# Patient Record
Sex: MALE | Race: BLACK OR AFRICAN AMERICAN | Employment: OTHER | URBAN - METROPOLITAN AREA
[De-identification: names, ages, dates, MRNs, and addresses within clinical notes are randomized per-mention and may not be internally consistent; named-entity substitution may affect disease eponyms.]

---

## 2017-10-24 ENCOUNTER — HOSPITAL ENCOUNTER (OUTPATIENT)
Dept: PHYSICAL THERAPY | Age: 52
Discharge: HOME | End: 2017-10-24
Payer: COMMERCIAL

## 2017-10-24 PROCEDURE — 97162 PT EVAL MOD COMPLEX 30 MIN: CPT

## 2017-10-24 PROCEDURE — 97110 THERAPEUTIC EXERCISES: CPT

## 2017-10-24 PROCEDURE — 97140 MANUAL THERAPY 1/> REGIONS: CPT
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
## 2017-10-27 ENCOUNTER — HOSPITAL ENCOUNTER (OUTPATIENT)
Dept: PHYSICAL THERAPY | Age: 52
Discharge: HOME | End: 2017-10-27
Payer: COMMERCIAL

## 2017-10-27 PROCEDURE — 97110 THERAPEUTIC EXERCISES: CPT

## 2017-10-27 PROCEDURE — 97140 MANUAL THERAPY 1/> REGIONS: CPT

## 2017-10-27 NOTE — PROGRESS NOTES
PHYSICAL THERAPY - DAILY TREATMENT NOTE    Patient Name: Genevieve Morse        Date: 10/27/2017  : 1965   YES Patient  Verified  Visit #:   2   of   12  Insurance: Payor: Rosa Quintero / Plan: [facility] PPO / Product Type: PPO /      In time: 9:35 Out time: 10:30   Total Treatment Time: 55     Medicare Time Tracking (below)   Total Timed Codes (min):  n/a 1:1 Treatment Time:  n/a     TREATMENT AREA =  Low back pain [M54.5]  Neck pain [M54.2]    SUBJECTIVE  Pain Level (on 0 to 10 scale):  3  / 10   Medication Changes/New allergies or changes in medical history, any new surgeries or procedures? NO    If yes, update Summary List   Subjective Functional Status/Changes:  []  No changes reported     Pt c/o pain in R sided LB and neck. He said it was about 6/10 when he woke up, but he took Aleve and now reports 2/10 pain level. OBJECTIVE  Modalities Rationale:     decrease pain and increase tissue extensibility to improve patient's ability to perform ADLs and cervical ROM without pain.    min [] Estim, type/location:                                      []  att     []  unatt     []  w/US     []  w/ice    []  w/heat    min []  Mechanical Traction: type/lbs                   []  pro   []  sup   []  int   []  cont    []  before manual    []  after manual    min []  Ultrasound, settings/location:      min []  Iontophoresis w/ dexamethasone, location:                                               []  take home patch       []  in clinic   12 min []  Ice     [x]  Heat    location/position: Supine to cervical and LB.    min []  Vasopneumatic Device, press/temp:     min []  Other:    [] Skin assessment post-treatment (if applicable):    []  intact    []  redness- no adverse reaction     []redness  adverse reaction:        33 min Therapeutic Exercise:  [x]  See flow sheet   Rationale:      increase ROM and increase strength to improve the patients ability to push, pull, lift, walk and bend without pain. 10  min Manual Therapy: Manual distraction and passive stretching to R LE. STM to R UT and passive cervical mobility/stretching. Suboccipital release and cervical distraction. Rationale:      decrease pain, increase ROM and increase tissue extensibility to improve patient's ability to per WB activities with reduced pain and increased duration and reduce pain with cervical rotation while driving.         min Patient Education:  YES  Reviewed HEP   []  Progressed/Changed HEP based on: Other Objective/Functional Measures: Added chin TM, UBE, chin tucks, LS, HS and QL stretch, SB roll outs and supine TA with march    Post Treatment Pain Level (on 0 to 10) scale:   0  / 10     ASSESSMENT  Assessment/Changes in Function:     Painful with end range cervical rotation to R. TRP noted in R UT.     []  See Progress Note/Recertification   Patient will continue to benefit from skilled PT services to modify and progress therapeutic interventions, address functional mobility deficits, address ROM deficits, address strength deficits, analyze and address soft tissue restrictions and assess and modify postural abnormalities to attain remaining goals. Progress toward goals / Updated goals: · Short Term Goals: To be accomplished in 2  weeks:  1) Establish HEP. 2) Patient will report decreased c/o pain to < or = 6/10 at the worst to facilitate prolonged standing with manageable sx in L/S.  3) Patient to perform 50% bridge without pain indicating improved core strength to improve ambulation around the grocery store. 4) Patient will increase RUDY ham 90/90 by 6 deg in order to improve ability to tolerate prolonged standing. 5) Improve C/S AROM in Rotation R to 50% of normal to facilitate ADL's such as driving.       · Long Term Goals:  To be accomplished in 6 weeks:  1) Patient independent with HEP and able to demo proper body mechanics for floor to chest lifting of 25 lbs in order to improve ability to lift work supplies. 2) Patient will increase L/S ROM in SB left and Rotation left to 85% to increase ability to perform household chores. 3) Increase FOTO to 74 indicating improved function and quality of life. 4) Patient will improve walking tolerance to 2 miles in order to improve ability to go grocery shopping. 5) Patient to perform 85% bridge indicating improved core strength to improve ambulation around the grocery store. 6) Improve C/S AROM to 75% of normal to facilitate ADL's such as driving. 7) Patient to be independent in SI self correct for improved functional mobility. 8) Increase L Sh strength in supraspinatus to 5-/5 without pain to facilitate plastering walls.      PLAN  [x]  Upgrade activities as tolerated YES Continue plan of care   []  Discharge due to :    []  Other:      Therapist: Wellington Tavares    Date: 10/27/2017 Time: 10:18 AM     Future Appointments  Date Time Provider Rukhsana Marshall   10/31/2017 5:00 PM [address]   11/2/2017 4:00 PM [address]   11/7/2017 4:30 PM Leann Tellez PTA [facility]   11/9/2017 4:00 PM [facility]   11/14/2017 4:30 PM [facility]   11/16/2017 4:30 PM [facility]   11/21/2017 4:30 PM [facility]   11/28/2017 4:30 PM [address]   11/30/2017 4:30 PM [address]   12/5/2017 4:30 PM Anna [address]

## 2017-10-31 ENCOUNTER — HOSPITAL ENCOUNTER (OUTPATIENT)
Dept: PHYSICAL THERAPY | Age: 52
Discharge: HOME | End: 2017-10-31
Payer: COMMERCIAL

## 2017-10-31 PROCEDURE — 97140 MANUAL THERAPY 1/> REGIONS: CPT

## 2017-10-31 PROCEDURE — 97110 THERAPEUTIC EXERCISES: CPT

## 2017-10-31 NOTE — PROGRESS NOTES
PHYSICAL THERAPY - DAILY TREATMENT NOTE    Patient Name: Toño Juares        Date: 10/31/2017  : 1965   YES Patient  Verified  Visit #:   3   of   12  Insurance: Payor: Delena Boeck / Plan: [facility] PPO / Product Type: PPO /      In time: 4:58 pm Out time: 6:06 pm   Total Treatment Time: 68     Medicare Time Tracking (below)   Total Timed Codes (min):  n/a 1:1 Treatment Time:  n/a     TREATMENT AREA =  Low back pain [M54.5]  Neck pain [M54.2]  SUBJECTIVE  Pain Level (on 0 to 10 scale):  2-3  / 10   Medication Changes/New allergies or changes in medical history, any new surgeries or procedures? NO    If yes, update Summary List   Subjective Functional Status/Changes:  []  No changes reported     Pt states \"Katharina been moving all day I havent really felt it much\"       OBJECTIVE  Modalities Rationale:     decrease pain to improve patient's ability to perform functional ADLs   min [] Estim, type/location:                                      []  att     []  unatt     []  w/US     []  w/ice    []  w/heat    min []  Mechanical Traction: type/lbs                   []  pro   []  sup   []  int   []  cont    []  before manual    []  after manual    min []  Ultrasound, settings/location:      min []  Iontophoresis w/ dexamethasone, location:                                               []  take home patch       []  in clinic   10 min []  Ice     []  Heat    location/position: Supine to cervical and LB.    min []  Vasopneumatic Device, press/temp:     min []  Other:    [x] Skin assessment post-treatment (if applicable):    [x]  intact    []  redness- no adverse reaction     []redness  adverse reaction:        48 min Therapeutic Exercise:  [x]  See flow sheet   Rationale:      increase ROM and increase strength to improve the patients ability to perform ADLs.      10 min Manual Therapy: Prone STM/DTM to R C/S UT and Levator scap, R L/S erector spinae, and R QL, prone quad stretch   Rationale: decrease pain, increase ROM, increase tissue extensibility and decrease trigger points in L/S to improve patient's ability to tolerate prolonged standing. min Patient Education:  YES  Reviewed HEP   []  Progressed/Changed HEP based on: Other Objective/Functional Measures: Added tband rows, pec stretch, bridge, inclined calf stretch with good pt tolerance and no complaints of sx. Post Treatment Pain Level (on 0 to 10) scale:   3.5  / 10     ASSESSMENT  Assessment/Changes in Function:     Pt presented with increased TTP and mm tone in R UT, with improved tone in R QL. Decreased RUDY quad flexibility. Progressed UE strengthening and flexibility as appropriate. Demonstrated fair bridge without increases in L/S sx.      []  See Progress Note/Recertification   Patient will continue to benefit from skilled PT services to modify and progress therapeutic interventions, address functional mobility deficits, address ROM deficits, address strength deficits, analyze and address soft tissue restrictions, analyze and cue movement patterns, analyze and modify body mechanics/ergonomics and assess and modify postural abnormalities to attain remaining goals. Progress toward goals / Updated goals:    Progressing towards STG 1.   1) Est HEP.       PLAN  [x]  Upgrade activities as tolerated YES Continue plan of care   []  Discharge due to :    []  Other:      Therapist: Xiomara Morris DPT     Date: 10/31/2017 Time: 8:01 AM     Future Appointments  Date Time Provider Rukhsana Marshall   10/31/2017 5:00 PM [address]   11/2/2017 4:00 PM Ramesh Boys [facility]   11/7/2017 4:30 PM Jaswinder Galvin PTA [facility]   11/9/2017 4:00 PM Jaswinder Galvin PTA [facility]   11/14/2017 4:30 PM Jaswinder Galvin PTA [facility]   11/16/2017 4:30 PM Jaswinder Galvin PTA [facility]   11/21/2017 4:30 PM Jaswinder Galvin PTA [facility]   11/28/2017 4:30 PM [address]   11/30/2017 4:30 PM Tay Dawson [facility]   12/5/2017 4:30 PM Melanie Foss [facility]

## 2017-11-02 ENCOUNTER — HOSPITAL ENCOUNTER (OUTPATIENT)
Dept: PHYSICAL THERAPY | Age: 52
End: 2017-11-02
Payer: COMMERCIAL

## 2017-11-03 ENCOUNTER — HOSPITAL ENCOUNTER (OUTPATIENT)
Dept: PHYSICAL THERAPY | Age: 52
Discharge: HOME | End: 2017-11-03
Payer: COMMERCIAL

## 2017-11-03 PROCEDURE — 97110 THERAPEUTIC EXERCISES: CPT

## 2017-11-03 PROCEDURE — 97140 MANUAL THERAPY 1/> REGIONS: CPT

## 2017-11-07 ENCOUNTER — HOSPITAL ENCOUNTER (OUTPATIENT)
Dept: PHYSICAL THERAPY | Age: 52
Discharge: HOME | End: 2017-11-07
Payer: COMMERCIAL

## 2017-11-07 PROCEDURE — 97110 THERAPEUTIC EXERCISES: CPT

## 2017-11-07 PROCEDURE — 97140 MANUAL THERAPY 1/> REGIONS: CPT

## 2017-11-07 NOTE — PROGRESS NOTES
PHYSICAL THERAPY - DAILY TREATMENT NOTE    Patient Name: Alvin Oseguera        Date: 2017  : 1965   YES Patient  Verified  Visit #:     Insurance: Payor: Michaela Richardson / Plan: [facility] PPO / Product Type: PPO /      In time: 4:36 pm Out time: 5: 45pm   Total Treatment Time: 69         TREATMENT AREA =  Low back pain [M54.5]  Neck pain [M54.2]    SUBJECTIVE  Pain Level (on 0 to 10 scale): 5.5  / 10   Medication Changes/New allergies or changes in medical history, any new surgeries or procedures? NO    If yes, update Summary List   Subjective Functional Status/Changes:  []  No changes reported     \"I woke up the other day with a kink in my neck. \"          OBJECTIVE  Modalities Rationale:     decrease edema, decrease inflammation, decrease pain and increase tissue extensibility to improve patient's ability to perform functional ADLs   min [] Estim, type/location:                                      []  att     []  unatt     []  w/US     []  w/ice    []  w/heat    min []  Mechanical Traction: type/lbs                   []  pro   []  sup   []  int   []  cont    []  before manual    []  after manual    min []  Ultrasound, settings/location:      min []  Iontophoresis w/ dexamethasone, location:                                               []  take home patch       []  in clinic   10 min []  Ice     [x]  Heat    location/position: Supine C/S, L/S    min []  Vasopneumatic Device, press/temp:     min []  Other:    [x] Skin assessment post-treatment (if applicable):    [x]  intact    []  redness- no adverse reaction     []redness  adverse reaction:        48 min Therapeutic Exercise:  [x]  See flow sheet   Rationale:      increase ROM, increase strength and improve coordination to improve the patients ability to perform functional ADLs     11 min Manual Therapy: Prone STM/DTM to B UT, C/S, TrPt R medial border of scapula, STM B C/S paraspinals   Rationale:      decrease pain, increase ROM, increase tissue extensibility and decrease trigger points to improve patient's ability to improve tissue mobility for functional ADLs       min Patient Education:  YES  Reviewed HEP   []  Progressed/Changed HEP based on: Other Objective/Functional Measures:  C/S: LR: 60%, RR: 50%-pain R C/S, LSB: 50%-L UT; RSB: 50%  Review HEP. Post Treatment Pain Level (on 0 to 10) scale:   4  / 10     ASSESSMENT  Assessment/Changes in Function:   Changed supine retraction to supine vs sitting with improving tolerance. TrPt tenderness R UT, R QL & lumbar paraspinals and R>L C/S paraspinals. C/S AROM painful and impaired  in B CS rotation and SB.   []  See Progress Note/Recertification   Patient will continue to benefit from skilled PT services to modify and progress therapeutic interventions, address functional mobility deficits, address ROM deficits, address strength deficits, analyze and address soft tissue restrictions and instruct in home and community integration to attain remaining goals. Progress toward goals / Updated goals:  1) Establish HEP. -currently in progress   2) Patient will report decreased c/o pain to < or = 6/10 at the worst to facilitate prolonged standing with manageable sx in L/S.  3) Patient to perform 50% bridge without pain indicating improved core strength to improve ambulation around the grocery store. 4) Patient will increase RUDY ham 90/90 by Shirin Yoon in order to improve ability to tolerate prolonged standing.    5) Improve C/S AROM in Rotation R to 50% of normal to facilitate ADL's such as driving.             PLAN  []  Upgrade activities as tolerated YES Continue plan of care   []  Discharge due to :    []  Other:      Therapist: Dinah Salas PTA    Date: 11/7/2017 Time: 5:45  PM     Future Appointments  Date Time Provider Rukhsana Marshall   11/9/2017 4:00 PM Eulas Officer [facility]   11/14/2017 4:30 PM Dinah Salas PTA [facility]   11/16/2017 4:30 PM Heber Rodriguez PTA [facility]   11/21/2017 4:30 PM Heber Rodriguez PTA [facility]   11/28/2017 4:30 PM [address]   11/30/2017 4:30 PM [address]   12/5/2017 4:30 PM Jessy Sanchez [facility]

## 2017-11-09 ENCOUNTER — HOSPITAL ENCOUNTER (OUTPATIENT)
Dept: PHYSICAL THERAPY | Age: 52
Discharge: HOME | End: 2017-11-09
Payer: COMMERCIAL

## 2017-11-09 PROCEDURE — 97140 MANUAL THERAPY 1/> REGIONS: CPT

## 2017-11-09 PROCEDURE — 97110 THERAPEUTIC EXERCISES: CPT

## 2017-11-09 NOTE — PROGRESS NOTES
PHYSICAL THERAPY - DAILY TREATMENT NOTE    Patient Name: Jonelle Escobar        Date: 2017  : 1965   YES Patient  Verified  Visit #:     Insurance: Payor: Maria C Mejia / Plan: [facility] PPO / Product Type: PPO /      In time: 4:08 pm Out time: 5:18 pm   Total Treatment Time: 70     TREATMENT AREA =  Low back pain [M54.5]  Neck pain [M54.2]    SUBJECTIVE  Pain Level (on 0 to 10 scale):  0  / 10   Medication Changes/New allergies or changes in medical history, any new surgeries or procedures? NO    If yes, update Summary List   Subjective Functional Status/Changes:  []  No changes reported     Pt reports crick in neck lasted until yesterday. Today I'm feeling pretty good.          OBJECTIVE  Modalities Rationale:     decrease edema, decrease inflammation, decrease pain and increase tissue extensibility to improve patient's ability to perform functional ADLs   min [] Estim, type/location:                                      []  att     []  unatt     []  w/US     []  w/ice    []  w/heat    min []  Mechanical Traction: type/lbs                   []  pro   []  sup   []  int   []  cont    []  before manual    []  after manual    min []  Ultrasound, settings/location:      min []  Iontophoresis w/ dexamethasone, location:                                               []  take home patch       []  in clinic   10 min []  Ice     [x]  Heat    location/position: Supine CS, LS    min []  Vasopneumatic Device, press/temp:     min []  Other:    [x] Skin assessment post-treatment (if applicable):    [x]  intact    []  redness- no adverse reaction     []redness  adverse reaction:        50 min Therapeutic Exercise:  [x]  See flow sheet   (28 min billed)   Rationale:      increase ROM and increase strength to improve the patients ability to perform functional ADLs    10 min Manual Therapy: Prone STM/DTM to B UT, C/S, TrPt R medial border of scapula, STM B C/S paraspinals   Rationale: decrease pain, increase ROM, increase tissue extensibility and decrease trigger points to improve patient's ability to perform functional ADLs        min Patient Education:  YES  Reviewed HEP   []  Progressed/Changed HEP based on: Other Objective/Functional Measures:    Increased UBE x 6 min     Post Treatment Pain Level (on 0 to 10) scale:   1  / 10     ASSESSMENT  Assessment/Changes in Function:     Emphasized neutral spine posture in CS retraction & isometric. Advanced gentle UE strengthening with T-band. VC for proper ex technique ins. Pt reports soreness in upper/mid back after exercises. []  See Progress Note/Recertification   Patient will continue to benefit from skilled PT services to modify and progress therapeutic interventions, address functional mobility deficits, address ROM deficits, address strength deficits, analyze and address soft tissue restrictions, analyze and cue movement patterns and instruct in home and community integration to attain remaining goals. Progress toward goals / Updated goals:  1) Establish HEP. -currently in progress   2) Patient will report decreased c/o pain to < or = 6/10 at the worst to facilitate prolonged standing with manageable sx in L/S.-currently in progress  3) Patient to perform 50% bridge without pain indicating improved core strength to improve ambulation around the grocery store. 4) Patient will increase RUDY ham 90/90 by Brandi Rodriguez in order to improve ability to tolerate prolonged standing.    5) Improve C/S AROM in Rotation R to 50% of normal to facilitate ADL's such as driving.       PLAN  []  Upgrade activities as tolerated YES Continue plan of care   []  Discharge due to :    []  Other:      Therapist: Mariann Duggan PTA    Date: 11/9/2017 Time: 5:18 PM     Future Appointments  Date Time Provider Rukhsana Marshall   11/14/2017 4:30 PM Ze[facility]   11/16/2017 4:30 PM Zeina [facility]   11/21/2017 4:30 PM Garcia Diego Laurie [facility]   11/28/2017 4:30 PM [address]   11/30/2017 4:30 PM [address]   12/5/2017 4:30 PM Virgie Painting [facility]

## 2017-11-14 ENCOUNTER — HOSPITAL ENCOUNTER (OUTPATIENT)
Dept: PHYSICAL THERAPY | Age: 52
Discharge: HOME | End: 2017-11-14
Payer: COMMERCIAL

## 2017-11-14 PROCEDURE — 97140 MANUAL THERAPY 1/> REGIONS: CPT

## 2017-11-14 PROCEDURE — 97110 THERAPEUTIC EXERCISES: CPT

## 2017-11-14 NOTE — PROGRESS NOTES
PHYSICAL THERAPY - DAILY TREATMENT NOTE    Patient Name: Huy Leo        Date: 2017  : 1965   YES Patient  Verified  Visit #:     Insurance: Payor: Magaly Campos / Plan: [facility] PPO / Product Type: PPO /      In time: 4:30 pm Out time: 5:31 pm   Total Treatment Time: 61     TREATMENT AREA =  Low back pain [M54.5]  Neck pain [M54.2]    SUBJECTIVE  Pain Level (on 0 to 10 scale):  0 / 10   Medication Changes/New allergies or changes in medical history, any new surgeries or procedures? NO    If yes, update Summary List   Subjective Functional Status/Changes:  []  No changes reported     Pt reports he was standing about 20 min before his back started hurting @ ~ 3/10         OBJECTIVE  Modalities Rationale:   PD      51 min Therapeutic Exercise:  [x]  See flow sheet   Rationale:      increase ROM and increase strength to improve the patients ability to perform ADLs     10 min Manual Therapy: Prone STM/DTM to B lumbar paraspinals. Lower T/S    Rationale:      decrease pain, increase ROM and increase tissue extensibility to improve patient's ability to perform prolonged standing       min Patient Education:  YES  Reviewed HEP   []  Progressed/Changed HEP based on: Other Objective/Functional Measures:  RR: 90%; LR: 85%     Post Treatment Pain Level (on 0 to 10) scale:  1  / 10     ASSESSMENT  Assessment/Changes in Function:   Pt reporting slight increase in low back pain with standing QL stretch. Pt instructed in sitting QL stretch. Improving C/S AROM and decreasing pain with standing     []  See Progress Note/Recertification   Patient will continue to benefit from skilled PT services to modify and progress therapeutic interventions, address functional mobility deficits, address ROM deficits, address strength deficits and instruct in home and community integration to attain remaining goals. Progress toward goals / Updated goals:  1) Establish HEP. XMK [id number]  2) Patient will report decreased c/o pain to < or = 6/10 at the worst to facilitate prolonged standing with manageable sx in L/S.-met 11-14-17  3) Patient to perform 50% bridge without pain indicating improved core strength to improve ambulation around the grocery store. 4) Patient will increase RUDY ham 90/90 by Shirin Yoon in order to improve ability to tolerate prolonged standing.    5) Improve C/S AROM in Rotation R to 50% of normal to facilitate ADL's such as driving.  -currently in progress     PLAN  []  Upgrade activities as tolerated YES Continue plan of care   []  Discharge due to :    []  Other:      Therapist: Dinah Salas PTA    Date: 11/14/2017 Time: 5;31 PM     Future Appointments  Date Time Provider Rukhsana Marshall   11/16/2017 4:30 PM Eulas Officer [place] [id number]   11/21/2017 4:30 PM Eulas Officer [place] [address]   11/28/2017 4:30 PM [facility] [address] PT JOESPH 1 DMCPTA [facility]   11/30/2017 4:30 PM [place] [address] [facility]   12/5/2017 4:30 PM [place] [address] [facility] [address]

## 2017-11-16 ENCOUNTER — HOSPITAL ENCOUNTER (OUTPATIENT)
Dept: PHYSICAL THERAPY | Age: 52
Discharge: HOME | End: 2017-11-16
Payer: COMMERCIAL

## 2017-11-16 PROCEDURE — 97110 THERAPEUTIC EXERCISES: CPT

## 2017-11-16 NOTE — PROGRESS NOTES
PHYSICAL THERAPY - DAILY TREATMENT NOTE    Patient Name: Stas Ley        Date: 2017  : 1965   YES Patient  Verified  Visit #:     Insurance: Payor: Mirtha Romeo / Plan: [facility] PPO / Product Type: PPO /      In time: 4:34 pm Out time: 5:32 pm   Total Treatment Time: 58         TREATMENT AREA =  Low back pain [M54.5]  Neck pain [M54.2]    SUBJECTIVE  Pain Level (on 0 to 10 scale):  0  / 10   Medication Changes/New allergies or changes in medical history, any new surgeries or procedures? NO    If yes, update Summary List   Subjective Functional Status/Changes:  []  No changes reported     \"i haven't had any pain since I left here the other day. \"         OBJECTIVE  Modalities Rationale: PD        58 min Therapeutic Exercise:  [x]  See flow sheet   Rationale:      increase ROM and increase strength to improve the patients ability to perform forward bending, lifting, pushing        min Patient Education:  YES  Reviewed HEP   []  Progressed/Changed HEP based on: Other Objective/Functional Measures:  Updated written HEP for tband exercise     Post Treatment Pain Level (on 0 to 10) scale:  0  / 10     ASSESSMENT  Assessment/Changes in Function:     Hold manual and modalities secondary to no pain. Resume supine and sidelying hip strengthening exercise with no increase in sx. Add UE flex alternation with abd draw and hip flex. VCs for proper core engagement with alt UE and LE with core. Pt able to complete full bridge x 1 rep. []  See Progress Note/Recertification   Patient will continue to benefit from skilled PT services to modify and progress therapeutic interventions, address functional mobility deficits, address ROM deficits, address strength deficits, analyze and address soft tissue restrictions, analyze and cue movement patterns and instruct in home and community integration to attain remaining goals.    Progress toward goals / Updated goals:  1) Establish HEP. CST [id number]  2) Patient will report decreased c/o pain to < or = 6/10 at the worst to facilitate prolonged standing with manageable sx in L/S.-met 11-14-17  3) Patient to perform 50% bridge without pain indicating improved core strength to improve ambulation around the grocery store. -currently in progress  4) Patient will increase RUDY ham 90/90 by Breann Nelson in order to improve ability to tolerate prolonged standing.    5) Improve C/S AROM in Rotation R to 50% of normal to facilitate ADL's such as driving.  -currently in progress     PLAN  []  Upgrade activities as tolerated YES Continue plan of care   []  Discharge due to :    []  Other:      Therapist: Rachael Lima PTA    Date: 11/16/2017 Time: 5:32  PM     Future Appointments  Date Time Provider Rukhsana Marshall   11/21/2017 4:30 PM [place] [id number]   11/28/2017 4:30 PM [facility] [address] PT JOESPH 1 DMCPTA [facility]   11/30/2017 4:30 PM [place] [address] [facility]   12/5/2017 4:30 PM [place] [address] [facility]

## 2017-11-21 ENCOUNTER — HOSPITAL ENCOUNTER (OUTPATIENT)
Dept: PHYSICAL THERAPY | Age: 52
Discharge: HOME | End: 2017-11-21
Payer: COMMERCIAL

## 2017-11-21 PROCEDURE — 97110 THERAPEUTIC EXERCISES: CPT

## 2017-11-21 NOTE — PROGRESS NOTES
[address] [facility] AT [place] [address] [address] [facility] [address] - Phone: [phone]  Fax: [phone]  PROGRESS NOTE  Patient Name: Kory Blackwood : 1965   Treatment/Medical Diagnosis: Low back pain [M54.5]  Neck pain [M54.2]   Referral Source: Maki Ferro MD     Date of Initial Visit: *10-08-17 Attended Visits: 9 Missed Visits: 0     SUMMARY OF TREATMENT  Physical Therapy treatment has consisted of Therapeutic exercise for cervical and lumbar range of motion and strengthening, , HEP, Manual Therapy and moist heat/ice. CURRENT STATUS  Pt has progressed well in progression of all short term goals for initial HEP, decreasing pain with activity, and AROM. Pt reports 0-1/10 pain and ~ 75% overall improvement. pt reports functional improvements in forward bending, lifting, pushing and pulling, and prolonged walking, looking up. Functional deficits: lifting, pushing and pulling, plastering walls. Pt would benefit from continued skilled PT intervention to improve ROM, strength , flexibility  to  facilitate ADL's & overall functional status/quality of life. Goal/Measure of Progress Goal Met? 1.   Establish HEP.          Status at last Eval: dependent Current Status: Pt instructed in initial HEP. yes   2. Patient will report decreased c/o pain to < or = 6/10 at the worst to facilitate prolonged standing with manageable sx in L/S.           Status at last Eval: -8/10 Current Status: 0-1/10 yes   3. Patient to perform 50% bridge without pain indicating improved core strength to improve ambulation around the grocery store. Status at last Eval: na Current Status: Pt able to complete full bridge yes   4.  4) Patient will increase RUDY ham 90/90 by 6 deg in order to improve ability to tolerate prolonged standing.    5) Improve C/S AROM in Rotation R to 50% of normal to facilitate ADL's such as driving   Status at last Eval: RR: 20deg;LR: 62 deg  Ham: R: 150 deg:L 140 deg Current Status: RR: 69; LR: 66  Ham: R: 164  L: 165 yes     New Goals to be achieved in __2_  weeks:  1) Patient independent with HEP and able to demo proper body mechanics for floor to chest lifting of 25 lbs in order to improve ability to lift work supplies. 2) Increase FOTO to 74 indicating improved function and quality of life. 3) Patient will improve walking tolerance to 2 miles in order to improve ability to go grocery shopping. 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 If you have any questions/comments please contact us directly at  [phone]. Thank you for allowing us to assist in the care of your patient. LPTA Signature: Deejay Esteves PTA  Date: 11/21/2017   PT Signature: Gm Zhou DPT Time: 3:05 PM   NOTE TO PHYSICIAN:  PLEASE COMPLETE THE ORDERS BELOW AND FAX TO   [facility]: [phone]. If you are unable to process this request in 24 hours please contact our office:  [phone].    ___ I have read the above report and request that my patient continue as recommended.   ___ I have read the above report and request that my patient continue therapy with the following changes/special instructions:_________________________________________________________   ___ I have read the above report and request that my patient be discharged from therapy.      Physician Signature:        Date:       Time:

## 2017-11-21 NOTE — PROGRESS NOTES
PHYSICAL THERAPY - DAILY TREATMENT NOTE    Patient Name: Sallie Vazquez        Date: 2017  : 1965   YES Patient  Verified  Visit #:     Insurance: Payor: Maria Ines Mae / Plan: [facility] PPO / Product Type: PPO /      In time: 4:31 pm Out time: 5:30 pm   Total Treatment Time: 59     TREATMENT AREA =  Low back pain [M54.5]  Neck pain [M54.2]    SUBJECTIVE  Pain Level (on 0 to 10 scale):  0  / 10   Medication Changes/New allergies or changes in medical history, any new surgeries or procedures? NO    If yes, update Summary List   Subjective Functional Status/Changes:  []  No changes reported     \"I'm beat. I've been rolling all day. \" pt reports he has been off this week. No pain over the weekend. 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 , plastering        min Patient Education:  YES  Reviewed HEP   []  Progressed/Changed HEP based on: Other Objective/Functional Measures:  Advanced PRE's: 1# sh flex, scap, add shoulder flexion RTB, increase reps t band row: BTB, sh IR/ER RTB: 15 reps     Post Treatment Pain Level (on 0 to 10) scale:   0  / 10     ASSESSMENT  Assessment/Changes in Function:     See progress note     [x]  See Progress Note/Recertification   Patient will continue to benefit from skilled PT services to modify and progress therapeutic interventions, address functional mobility deficits, address ROM deficits, address strength deficits and instruct in home and community integration to attain remaining goals.    Progress toward goals / Updated goals:  See progress note     PLAN  []  Upgrade activities as tolerated YES Continue plan of care   []  Discharge due to :    []  Other:      Therapist: Deana Taylor PTA    Date: 2017 Time: 5:30  PM     Future Appointments  Date Time Provider Department [place]   11/28/2017 4:30 PM [facility] PT JOESPH 1 [facility]   11/30/2017 4:30 PM Melanie [address] [place] [address]   12/5/2017 4:30 PM Froylan Alberts [facility]

## 2017-11-28 ENCOUNTER — HOSPITAL ENCOUNTER (OUTPATIENT)
Dept: PHYSICAL THERAPY | Age: 52
Discharge: HOME | End: 2017-11-28
Payer: COMMERCIAL

## 2017-11-28 PROCEDURE — 97110 THERAPEUTIC EXERCISES: CPT

## 2017-11-28 NOTE — PROGRESS NOTES
PHYSICAL THERAPY - DAILY TREATMENT NOTE    Patient Name: Alvin Oseguera        Date: 2017  : 1965   YES Patient  Verified  Visit #:   10   of   12  Insurance: Payor: Michaela Richardson / Plan: [facility] PPO / Product Type: PPO /      In time: 4:31 Out time: 5:26   Total Treatment Time: 55     TREATMENT AREA =  Low back pain [M54.5]  Neck pain [M54.2]    SUBJECTIVE  Pain Level (on 0 to 10 scale):  0  / 10   Medication Changes/New allergies or changes in medical history, any new surgeries or procedures? NO    If yes, update Summary List   Subjective Functional Status/Changes:  []  No changes reported     Patient reports that he had no pain over the weekend. 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 Added standing ext with core     Post Treatment Pain Level (on 0 to 10) scale:   0  / 10     ASSESSMENT  Assessment/Changes in Function:     Excellent subjective improvement in pain with ADLs with increasing core strength. []  See Progress Note/Recertification   Patient will continue to benefit from skilled PT services to modify and progress therapeutic interventions, address functional mobility deficits, address ROM deficits, address strength deficits and instruct in home and community integration to attain remaining goals. Progress toward goals / Updated goals:  1) Patient independent with HEP and able to demo proper body mechanics for floor to chest lifting of 25 lbs in order to improve ability to lift work supplies.    2) Increase FOTO to 74 indicating improved function and quality of life.   3) Patient will improve walking tolerance to 2 miles in order to improve ability to go grocery shopping.    4) Increase L Sh strength in supraspinatus to 5-/5 without pain to facilitate plastering walls       PLAN  [x]  Upgrade activities as tolerated YES Continue plan of care   []  Discharge due to :    []  Other:      Therapist: Karishma Smith PT    Date: 11/28/2017 Time: 4:37 PM     Future Appointments  Date Time Provider Rukhsana Marshall   11/30/2017 4:30 PM Marli Farfan [facility]   12/5/2017 4:30 PM [place] [address]

## 2017-11-30 ENCOUNTER — HOSPITAL ENCOUNTER (OUTPATIENT)
Dept: PHYSICAL THERAPY | Age: 52
Discharge: HOME | End: 2017-11-30
Payer: COMMERCIAL

## 2017-11-30 PROCEDURE — 97110 THERAPEUTIC EXERCISES: CPT

## 2017-11-30 NOTE — PROGRESS NOTES
PHYSICAL THERAPY - DAILY TREATMENT NOTE    Patient Name: Tim Pak        Date: 2017  : 1965   YES Patient  Verified  Visit #:     Insurance: Payor: Vilma Gallus / Plan: [facility] PPO / Product Type: PPO /      In time: 4:31 pm Out time: 5:24 pm   Total Treatment Time: 53         TREATMENT AREA =  Low back pain [M54.5]  Neck pain [M54.2]    SUBJECTIVE  Pain Level (on 0 to 10 scale):  0 / 10   Medication Changes/New allergies or changes in medical history, any new surgeries or procedures? NO    If yes, update Summary List   Subjective Functional Status/Changes:  []  No changes reported     \"Feeling good. 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 \"  Pt reports walking tolerance ~2 miles. Walking every morning before work. OBJECTIVE  Modalities Rationale:  Na     53 min Therapeutic Exercise:  [x]  See flow sheet   Rationale:      increase ROM and increase strength to improve the patients ability to perform bending & lifting      min Patient Education:  YES  Reviewed HEP   []  Progressed/Changed HEP based on: Other Objective/Functional Measures:  Hold mini squats. Review discharge HEP. Post Treatment Pain Level (on 0 to 10) scale:   0  / 10     ASSESSMENT  Assessment/Changes in Function:   Pt demonstrating good progress toward all LTGs. Pt met LTGs #3 for walking tolerance. pt demonstrating difficulty in standing mini squat secondary to left knee instability with eccentric lowering. []  See Progress Note/Recertification   Patient will continue to benefit from skilled PT services to modify and progress therapeutic interventions, address functional mobility deficits, address ROM deficits, address strength deficits, analyze and address soft tissue restrictions and instruct in home and community integration to attain remaining goals. Progress toward goals / Updated goals:    Reassess for possible discharge to HEP next visit.       PLAN  [] Upgrade activities as tolerated YES Continue plan of care   []  Discharge due to :    []  Other:      Therapist: Jeovanny Arevalo PTA    Date: 11/30/2017 Time: 5:24  PM     Future Appointments  Date Time Provider Rukhsana Marshall   12/5/2017 4:30 PM [address]

## 2017-12-05 ENCOUNTER — HOSPITAL ENCOUNTER (OUTPATIENT)
Dept: PHYSICAL THERAPY | Age: 52
Discharge: HOME | End: 2017-12-05
Payer: COMMERCIAL

## 2017-12-05 PROCEDURE — 97110 THERAPEUTIC EXERCISES: CPT

## 2017-12-05 NOTE — PROGRESS NOTES
PHYSICAL THERAPY - DAILY TREATMENT NOTE    Patient Name: Kory Blackwood        Date: 2017  : 1965   YES Patient  Verified  Visit #:     Insurance: Payor: Melanie Shelton / Plan: [facility] PPO / Product Type: PPO /      In time: 4:23pm Out time: 5:04pm   Total Treatment Time: 41     Medicare Time Tracking (below)   Total Timed Codes (min):  n/a 1:1 Treatment Time:  n/a     TREATMENT AREA =  Low back pain [M54.5]  Neck pain [M54.2]    SUBJECTIVE  Pain Level (on 0 to 10 scale):  0  / 10   Medication Changes/New allergies or changes in medical history, any new surgeries or procedures? NO    If yes, update Summary List   Subjective Functional Status/Changes:  []  No changes reported     Pt states \"I feel 100% better\"         OBJECTIVE  Modalities Rationale:     n/a    41 min Therapeutic Exercise:  [x]  See flow sheet   Rationale:      increase ROM and increase strength to improve the patients ability to perform ADLs. min Patient Education:  YES  Reviewed HEP   []  Progressed/Changed HEP based on: Other Objective/Functional Measures:    FOTO =70     Post Treatment Pain Level (on 0 to 10) scale:   0  / 10     ASSESSMENT  Assessment/Changes in Function:     See DC.     []  See Progress Note/Recertification   Patient will continue to benefit from skilled PT services to modify and progress therapeutic interventions, address functional mobility deficits, address ROM deficits, address strength deficits, analyze and address soft tissue restrictions, analyze and cue movement patterns, analyze and modify body mechanics/ergonomics, assess and modify postural abnormalities, address imbalance/dizziness and instruct in home and community integration to attain remaining goals. Progress toward goals / Updated goals:    See DC     PLAN  []  Upgrade activities as tolerated YES Continue plan of care   [x]  Discharge due to : Met or progressing towards all goals.    []  Other: Therapist: Meghann Helms    Date: 12/5/2017 Time: 9:51 AM     Future Appointments  Date Time Provider Rukhsana Marshall   12/5/2017 4:30 PM [address]

## 2017-12-05 NOTE — PROGRESS NOTES
[address] [place] [address]  Phone: [phone]  Fax: [phone] SUMMARY  Patient Name: Jose Aranda : 1965   Treatment/Medical Diagnosis: Low back pain [M54.5]  Neck pain [M54.2]   Referral Source: Trevor Fonseca MD     Date of Initial Visit: 10-08-17 Attended Visits: 12 Missed Visits: 0     SUMMARY OF TREATMENT  Therapeutic exercises including ROM, strengthening, stretching, manual therapy including joint and soft tissue manipulation, balance training, postural re-education, HEP instruction. CURRENT STATUS  The pt has progressed well with therapy, consistently reporting decreased pain and increased functional ability. Pt reports 100% improvement in L/S and neck sx since initiating PT services. Based on progress from PT services and pt reported improvement, patient is appropriate for DC to home mgt of sx at this time. Goal/Measure of Progress Goal Met? 1. Patient independent with HEP and able to demo proper body mechanics for floor to chest lifting of 25 lbs in order to improve ability to lift work supplies.     Status at last Eval: Pain with lifting <25 lbs Current Status: Floor to chest lifting of 25lbs with good lifting mechanics  yes   2. Increase FOTO to 74 indicating improved function and quality of life. Status at last Eval: FOTO = 57 Current Status: FOTO = 70 progressing   3. Patient will improve walking tolerance to 2 miles in order to improve ability to go grocery shopping. Status at last Eval: Walking tolerance = 1.5 mi Current Status: Walking tolerance = 2 mi yes   4. 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 Progressing towards or have reached established goals.   If you have any questions/comments please contact us directly at [phone]. Thank you for allowing us to assist in the care of your patient.     Therapist Signature: Sheron Velazquez Date: 12/5/2017     Time: 9:51 AM

## 2024-01-25 ENCOUNTER — OFFICE VISIT (OUTPATIENT)
Age: 59
End: 2024-01-25
Payer: COMMERCIAL

## 2024-01-25 VITALS
HEART RATE: 75 BPM | HEIGHT: 74 IN | OXYGEN SATURATION: 96 % | RESPIRATION RATE: 16 BRPM | WEIGHT: 275.2 LBS | TEMPERATURE: 97.1 F | DIASTOLIC BLOOD PRESSURE: 81 MMHG | BODY MASS INDEX: 35.32 KG/M2 | SYSTOLIC BLOOD PRESSURE: 114 MMHG

## 2024-01-25 DIAGNOSIS — E11.9 TYPE 2 DIABETES MELLITUS WITHOUT COMPLICATION, WITHOUT LONG-TERM CURRENT USE OF INSULIN (HCC): ICD-10-CM

## 2024-01-25 DIAGNOSIS — I48.21 ATRIAL FIBRILLATION, PERMANENT (HCC): ICD-10-CM

## 2024-01-25 DIAGNOSIS — I50.32 HYPERTENSIVE HEART DISEASE WITH CHRONIC DIASTOLIC CONGESTIVE HEART FAILURE (HCC): ICD-10-CM

## 2024-01-25 DIAGNOSIS — E78.00 HYPERCHOLESTEREMIA: ICD-10-CM

## 2024-01-25 DIAGNOSIS — R94.31 ABNORMAL ECG: ICD-10-CM

## 2024-01-25 DIAGNOSIS — I73.9 PVD (PERIPHERAL VASCULAR DISEASE) WITH CLAUDICATION (HCC): ICD-10-CM

## 2024-01-25 DIAGNOSIS — I50.32 CHRONIC DIASTOLIC (CONGESTIVE) HEART FAILURE (HCC): ICD-10-CM

## 2024-01-25 DIAGNOSIS — R06.02 EXERTIONAL SHORTNESS OF BREATH: Primary | ICD-10-CM

## 2024-01-25 DIAGNOSIS — I63.511 CEREBROVASCULAR ACCIDENT (CVA) DUE TO OCCLUSION OF RIGHT MIDDLE CEREBRAL ARTERY (HCC): ICD-10-CM

## 2024-01-25 DIAGNOSIS — Z79.01 LONG TERM (CURRENT) USE OF ANTICOAGULANTS: ICD-10-CM

## 2024-01-25 DIAGNOSIS — R01.1 SYSTOLIC MURMUR: ICD-10-CM

## 2024-01-25 DIAGNOSIS — I11.0 HYPERTENSIVE HEART DISEASE WITH CHRONIC DIASTOLIC CONGESTIVE HEART FAILURE (HCC): ICD-10-CM

## 2024-01-25 DIAGNOSIS — R09.89 BRUIT OF RIGHT CAROTID ARTERY: ICD-10-CM

## 2024-01-25 PROCEDURE — 93000 ELECTROCARDIOGRAM COMPLETE: CPT | Performed by: INTERNAL MEDICINE

## 2024-01-25 PROCEDURE — 99215 OFFICE O/P EST HI 40 MIN: CPT | Performed by: INTERNAL MEDICINE

## 2024-01-25 RX ORDER — ATORVASTATIN CALCIUM 40 MG/1
40 TABLET, FILM COATED ORAL DAILY
COMMUNITY
Start: 2023-12-20

## 2024-01-25 RX ORDER — NEBIVOLOL 5 MG/1
5 TABLET ORAL DAILY
COMMUNITY
Start: 2023-11-09

## 2024-01-25 RX ORDER — APIXABAN 5 MG/1
5 TABLET, FILM COATED ORAL 2 TIMES DAILY
COMMUNITY
Start: 2023-12-21

## 2024-01-25 RX ORDER — PREGABALIN 200 MG/1
200 CAPSULE ORAL 3 TIMES DAILY
COMMUNITY
Start: 2023-12-24

## 2024-01-25 RX ORDER — LIDOCAINE 50 MG/G
PATCH TOPICAL PRN
COMMUNITY
Start: 2023-12-13

## 2024-01-25 RX ORDER — BUDESONIDE AND FORMOTEROL FUMARATE DIHYDRATE 160; 4.5 UG/1; UG/1
2 AEROSOL RESPIRATORY (INHALATION) 2 TIMES DAILY
COMMUNITY
Start: 2023-10-27

## 2024-01-25 RX ORDER — DULOXETIN HYDROCHLORIDE 30 MG/1
CAPSULE, DELAYED RELEASE ORAL 2 TIMES DAILY
COMMUNITY
Start: 2023-12-20

## 2024-01-25 RX ORDER — ASPIRIN 81 MG/1
81 TABLET, CHEWABLE ORAL DAILY
COMMUNITY
Start: 2021-12-09

## 2024-01-25 RX ORDER — BLOOD SUGAR DIAGNOSTIC
STRIP MISCELLANEOUS
COMMUNITY
Start: 2023-10-21

## 2024-01-25 RX ORDER — ALLOPURINOL 300 MG/1
300 TABLET ORAL DAILY
COMMUNITY
Start: 2023-12-21

## 2024-01-25 RX ORDER — HYDROCHLOROTHIAZIDE 25 MG/1
12.5 TABLET ORAL DAILY
COMMUNITY
Start: 2023-12-21

## 2024-01-25 RX ORDER — LEVALBUTEROL TARTRATE 45 UG/1
1-2 AEROSOL, METERED ORAL PRN
COMMUNITY

## 2024-01-25 ASSESSMENT — ENCOUNTER SYMPTOMS
EYES NEGATIVE: 1
ALLERGIC/IMMUNOLOGIC NEGATIVE: 1
GASTROINTESTINAL NEGATIVE: 1
SHORTNESS OF BREATH: 1

## 2024-01-25 NOTE — PROGRESS NOTES
diastolic congestive heart failure (HCC)  -     EKG 12 Lead  3. Atrial fibrillation, permanent (HCC)  4. Chronic diastolic (congestive) heart failure (HCC)  5. Type 2 diabetes mellitus without complication, without long-term current use of insulin (HCC)  6. Abnormal ECG  7. Long term (current) use of anticoagulants  8. Systolic murmur  9. Hypercholesteremia  10. Cerebrovascular accident (CVA) due to occlusion of right middle cerebral artery (HCC)  -     Vascular duplex carotid bilateral; Future  11. PVD (peripheral vascular disease) with claudication (HCC)  -     Vascular duplex lower extremity arteries bilateral; Future  12. Bruit of right carotid artery  -     Vascular duplex carotid bilateral; Future    Patient does have some mild shortness of breath but has limited his activity to prevent recurrence.  Blood pressures are well-controlled today.  I would not make any additional changes in medication.  Evidence of diastolic heart failure is limited at this time.  I did discuss with him the need for more aggressive evaluation and treatment.  He is not following his diet well.  He does have claudication-like symptoms and needs vascular ultrasound to clarify.  Given his history of stroke and right carotid bruit, I am also recommended a carotid study to be performed in the near future.  Weight loss is recommended as well as increasing activity.  I did have a long discussion with him today in this regard.    Results for orders placed or performed in visit on 01/25/24   EKG 12 Lead    Impression    Atrial fibrillation with a controlled ventricular response, normal axis, left ventricular hypertrophy and diffuse nonspecific T wave abnormality    Sukhi Hunter MD        Return in about 7 months (around 8/25/2024) for follow up.      On this date 1/25/2024 I have spent 41 minutes reviewing previous notes, test results and face to face with the patient discussing the diagnosis and importance of compliance with the treatment

## 2024-02-20 ENCOUNTER — TELEPHONE (OUTPATIENT)
Age: 59
End: 2024-02-20

## 2024-02-20 NOTE — TELEPHONE ENCOUNTER
Reviewed message from Dr. Hunter and called patient. Reviewed results with him. He verbalized understanding. I sent him the code to log on to [facility] and he is going to have his wife help him when he gets home.

## 2024-02-20 NOTE — TELEPHONE ENCOUNTER
----- Message from Sukhi Hunter Sr., MD sent at 2/11/2024  2:53 PM EST -----  Reviewed her carotid study.  No significant blockage noted.  Please inform her and encourage her to enroll in the [employer] portal so I can message her directly.

## 2024-02-20 NOTE — TELEPHONE ENCOUNTER
----- Message from Sukhi Hunter Sr., MD sent at 2/4/2024 11:50 AM EST -----  I reviewed his carotid study.  No significant abnormalities.  No additional treatment needed.  Inform the patient but also encourage him to enroll into the portal for future correspondence

## 2024-02-20 NOTE — TELEPHONE ENCOUNTER
Contacted pt at mobile number Two patient Identifiers confirmed, name and date of birth. Advised pt per  carotid study normal, Pt verbalized understanding.

## 2024-03-26 NOTE — PROGRESS NOTES
Detail Level: Generalized PHYSICAL THERAPY - DAILY TREATMENT NOTE    Patient Name: Soniya Lovelace        Date: 11/3/2017  : 1965   YES Patient  Verified  Visit #:     Insurance: Payor: Samia Mcgovern / Plan: [facility] PPO / Product Type: PPO /      In time: 2:58 pm Out time: 4:06 pm   Total Treatment Time: 68         TREATMENT AREA =  Low back pain [M54.5]  Neck pain [M54.2]    SUBJECTIVE  Pain Level (on 0 to 10 scale):  0  / 10   Medication Changes/New allergies or changes in medical history, any new surgeries or procedures? yes    If yes, update Summary List   Subjective Functional Status/Changes:  []  No changes reported   Pt reports \"So-So today. They gave me some new medication. \" pt reports mild upset stomach . No pain today. \"Prettey good today. \"  Pt reports pain R side of back mostly in am.          OBJECTIVE  Modalities Rationale:     decrease edema, decrease inflammation, decrease pain and increase tissue extensibility to improve patient's ability to perform functional ADLs     min [] Estim, type/location:                                      []  att     []  unatt     []  w/US     []  w/ice    []  w/heat    min []  Mechanical Traction: type/lbs                   []  pro   []  sup   []  int   []  cont    []  before manual    []  after manual    min []  Ultrasound, settings/location:      min []  Iontophoresis w/ dexamethasone, location:                                               []  take home patch       []  in clinic   10 min []  Ice     [x]  Heat    location/position: Supine C/S, L/S    min []  Vasopneumatic Device, press/temp:     min []  Other:    [x] Skin assessment post-treatment (if applicable):    [x]  intact    []  redness- no adverse reaction     []redness  adverse reaction:        49 min Therapeutic Exercise:  [x]  See flow sheet   Rationale:      increase ROM and increase strength to improve the patients ability to perform functional ADLs    9 min Manual Therapy: Prone DTM to R >L lumbar paraspinals, R QL region   Rationale:      decrease pain, increase ROM, increase tissue extensibility and decrease trigger points to improve patient's ability to improve tissue mobility for functional ADLs     min Patient Education:  YES  Reviewed HEP   []  Progressed/Changed HEP based on: Other Objective/Functional Measures:  Increase UBE x 6 min  Add AROM sh flex, scap  Noted equal SI alignment   Post Treatment Pain Level (on 0 to 10) scale:   0  / 10     ASSESSMENT  Assessment/Changes in Function:   Review neutral spine posture in sitting-supported & unsupported sitting   []  See Progress Note/Recertification   Patient will continue to benefit from skilled PT services to modify and progress therapeutic interventions, address functional mobility deficits, address ROM deficits, address strength deficits and instruct in home and community integration to attain remaining goals.    Progress toward goals / Updated goals:  Progressing towards STG 1.   1) Est HEP-currently in progress     PLAN  []  Upgrade activities as tolerated YES Continue plan of care   []  Discharge due to :    []  Other:      Therapist: Mariann Duggan PTA    Date: 11/3/2017 Time: 4:06  PM     Future Appointments  Date Time Provider Rukhsana Marshall   11/3/2017 3:00 PM Mariann Duggan PTA [facility]   11/7/2017 4:30 PM Mariann Duggan PTA [facility]   11/9/2017 4:00 PM Mariann Duggan PTA [facility]   11/14/2017 4:30 PM Mariann Duggan PTA [facility]   11/16/2017 4:30 PM Mariann Duggan PTA [facility]   11/21/2017 4:30 PM Mariann Duggan PTA [facility]   11/28/2017 4:30 PM Melanie [address] [place] [address]   11/30/2017 4:30 PM [address]   12/5/2017 4:30 PM Rolye [address] Detail Level: Detailed

## 2024-04-03 NOTE — TELEPHONE ENCOUNTER
PCP: Domingo Hunetr II, MD    Last appt:  1/25/2024   Future Appointments   Date Time Provider Department Center   8/28/2024  8:30 AM Sukhi Hunter Sr., MD [facility]       Requested Prescriptions     Pending Prescriptions Disp Refills    ELIQUIS 5 MG TABS tablet 180 tablet 3     Sig: Take 1 tablet by mouth 2 times daily       Request for a 30 or 90 day supply? Provider Discretion    Pharmacy: confirmed    Other Comments:n/a

## 2024-04-04 RX ORDER — APIXABAN 5 MG/1
5 TABLET, FILM COATED ORAL 2 TIMES DAILY
Qty: 180 TABLET | Refills: 3 | Status: SHIPPED | OUTPATIENT
Start: 2024-04-04

## 2024-08-28 ENCOUNTER — OFFICE VISIT (OUTPATIENT)
Age: 59
End: 2024-08-28
Payer: COMMERCIAL

## 2024-08-28 VITALS
OXYGEN SATURATION: 99 % | HEIGHT: 74 IN | DIASTOLIC BLOOD PRESSURE: 90 MMHG | SYSTOLIC BLOOD PRESSURE: 116 MMHG | HEART RATE: 74 BPM | WEIGHT: 274 LBS | TEMPERATURE: 89.7 F | BODY MASS INDEX: 35.16 KG/M2

## 2024-08-28 DIAGNOSIS — R01.1 SYSTOLIC MURMUR: ICD-10-CM

## 2024-08-28 DIAGNOSIS — E78.00 HYPERCHOLESTEREMIA: ICD-10-CM

## 2024-08-28 DIAGNOSIS — R94.31 ABNORMAL ECG: ICD-10-CM

## 2024-08-28 DIAGNOSIS — I50.32 CHRONIC DIASTOLIC (CONGESTIVE) HEART FAILURE (HCC): ICD-10-CM

## 2024-08-28 DIAGNOSIS — R09.89 BRUIT OF RIGHT CAROTID ARTERY: ICD-10-CM

## 2024-08-28 DIAGNOSIS — I63.511 CEREBROVASCULAR ACCIDENT (CVA) DUE TO OCCLUSION OF RIGHT MIDDLE CEREBRAL ARTERY (HCC): ICD-10-CM

## 2024-08-28 DIAGNOSIS — R06.02 EXERTIONAL SHORTNESS OF BREATH: Primary | ICD-10-CM

## 2024-08-28 DIAGNOSIS — I10 PRIMARY HYPERTENSION: ICD-10-CM

## 2024-08-28 DIAGNOSIS — E11.9 TYPE 2 DIABETES MELLITUS WITHOUT COMPLICATION, WITHOUT LONG-TERM CURRENT USE OF INSULIN (HCC): ICD-10-CM

## 2024-08-28 DIAGNOSIS — I48.21 ATRIAL FIBRILLATION, PERMANENT (HCC): ICD-10-CM

## 2024-08-28 DIAGNOSIS — Z79.01 LONG TERM (CURRENT) USE OF ANTICOAGULANTS: ICD-10-CM

## 2024-08-28 PROCEDURE — 99214 OFFICE O/P EST MOD 30 MIN: CPT | Performed by: INTERNAL MEDICINE

## 2024-08-28 PROCEDURE — 3080F DIAST BP >= 90 MM HG: CPT | Performed by: INTERNAL MEDICINE

## 2024-08-28 PROCEDURE — 3074F SYST BP LT 130 MM HG: CPT | Performed by: INTERNAL MEDICINE

## 2024-08-28 ASSESSMENT — ENCOUNTER SYMPTOMS
ALLERGIC/IMMUNOLOGIC NEGATIVE: 1
EYES NEGATIVE: 1
SHORTNESS OF BREATH: 1
GASTROINTESTINAL NEGATIVE: 1

## 2024-08-28 ASSESSMENT — PATIENT HEALTH QUESTIONNAIRE - PHQ9
1. LITTLE INTEREST OR PLEASURE IN DOING THINGS: NOT AT ALL
SUM OF ALL RESPONSES TO PHQ QUESTIONS 1-9: 0
SUM OF ALL RESPONSES TO PHQ9 QUESTIONS 1 & 2: 0
2. FEELING DOWN, DEPRESSED OR HOPELESS: NOT AT ALL
SUM OF ALL RESPONSES TO PHQ QUESTIONS 1-9: 0

## 2024-08-28 NOTE — PROGRESS NOTES
1. \"Have you been to the ER, urgent care clinic since your last visit?  Hospitalized since your last visit?\" Reviewed by Dr. Sukhi Hunter    2. \"Have you seen or consulted any other health care providers outside of the [facility] since your last visit?\" Reviewed by Dr. Sukhi Hunter

## 2024-08-28 NOTE — PROGRESS NOTES
Waqas Roach (:  1965) is a 59 y.o. male,Established patient, here for evaluation of the following chief complaint(s):  Follow-up (7 months )    Subjective   SUBJECTIVE/OBJECTIVE:  HPI  Patient presents today for follow-up. He has a history of stroke that occurred in the fall and winter of . He states the first event occurred around September or October and the second one in December of that year. He has no history of known cardiomyopathy or atrial fibrillation, but it was felt that his stroke came from a cardiovascular source. He does have cardiac risks to include hypertension, diabetes, and hypercholesterolemia. However, no history of known coronary disease or systolic heart failure.           Today, he is doing doing fairly well but does note some mild shortness of breath with activity. He states he does not exercise much but does get dyspneic with activity.  No palpitations or tachycardia. No syncope or near syncope. No orthopnea or paroxysmal nocturnal dyspnea. No history of thyroid disease. No history of tobacco use.  He does note some symptoms of claudication with bilateral leg pain when he walks and sometimes at rest         I have carefully reviewed all available medical records, previous office notes, lab, x-ray, and procedure reports.    Past Medical History:   Diagnosis Date    Asthma     Cerebral artery occlusion with cerebral infarction (HCC)     Diabetes mellitus (HCC)     Hypertension         History reviewed. No pertinent surgical history.     Allergies   Allergen Reactions    Lisinopril Cough    Statins Other (See Comments)        Current Outpatient Medications   Medication Sig Dispense Refill    ELIQUIS 5 MG TABS tablet Take 1 tablet by mouth 2 times daily 180 tablet 3    pregabalin (LYRICA) 200 MG capsule Take 1 capsule by mouth 3 times daily.      nebivolol (BYSTOLIC) 5 MG tablet Take 1 tablet by mouth daily      hydroCHLOROthiazide (HYDRODIURIL) 25 MG tablet Take 0.5 tablets  complete (PRN contrast/bubble/strain/3D); Future  2. Chronic diastolic (congestive) heart failure (HCC)  -     Echo (TTE) complete (PRN contrast/bubble/strain/3D); Future  3. Primary hypertension  4. Bruit of right carotid artery  5. Atrial fibrillation, permanent (HCC)  6. Abnormal ECG  7. Systolic murmur  -     Echo (TTE) complete (PRN contrast/bubble/strain/3D); Future  8. Long term (current) use of anticoagulants  9. Hypercholesteremia  10. Type 2 diabetes mellitus without complication, without long-term current use of insulin (HCC)  11. Cerebrovascular accident (CVA) due to occlusion of right middle cerebral artery (HCC)    I did review with the patient his last carotid Doppler study 02/24 showing mild disease bilaterally.  Lower extremity arterial study also performed the same month and year did not show any significant disease.  ECG remains atrial fibrillation.  I did discuss with him that I would recommend he continue Eliquis but that an echocardiogram needs to be performed to assess for any new wall motion abnormalities or reduced heart function.     No results found for any visits on 08/28/24.     Return in about 7 months (around 3/28/2025) for follow up.    On this date 1/25/2024 I have spent 37 minutes reviewing previous notes, test results and face to face with the patient discussing the diagnosis and importance of compliance with the treatment plan as well as documenting on the day of the visit.    An electronic signature was used to authenticate this note.    --Sukhi Hunter MD

## 2025-03-26 ENCOUNTER — OFFICE VISIT (OUTPATIENT)
Age: 60
End: 2025-03-26
Payer: COMMERCIAL

## 2025-03-26 VITALS
DIASTOLIC BLOOD PRESSURE: 77 MMHG | HEART RATE: 83 BPM | SYSTOLIC BLOOD PRESSURE: 115 MMHG | OXYGEN SATURATION: 97 % | BODY MASS INDEX: 34.55 KG/M2 | HEIGHT: 74 IN | WEIGHT: 269.2 LBS | TEMPERATURE: 97.7 F

## 2025-03-26 DIAGNOSIS — I10 PRIMARY HYPERTENSION: ICD-10-CM

## 2025-03-26 DIAGNOSIS — E11.9 TYPE 2 DIABETES MELLITUS WITHOUT COMPLICATION, WITHOUT LONG-TERM CURRENT USE OF INSULIN: ICD-10-CM

## 2025-03-26 DIAGNOSIS — I50.42 CHRONIC COMBINED SYSTOLIC AND DIASTOLIC CHF (CONGESTIVE HEART FAILURE) (HCC): ICD-10-CM

## 2025-03-26 DIAGNOSIS — I63.511 CEREBROVASCULAR ACCIDENT (CVA) DUE TO OCCLUSION OF RIGHT MIDDLE CEREBRAL ARTERY (HCC): ICD-10-CM

## 2025-03-26 DIAGNOSIS — E78.00 HYPERCHOLESTEREMIA: ICD-10-CM

## 2025-03-26 DIAGNOSIS — I48.21 ATRIAL FIBRILLATION, PERMANENT (HCC): ICD-10-CM

## 2025-03-26 DIAGNOSIS — I42.0 CONGESTIVE CARDIOMYOPATHY (HCC): ICD-10-CM

## 2025-03-26 DIAGNOSIS — Z79.01 LONG TERM (CURRENT) USE OF ANTICOAGULANTS: ICD-10-CM

## 2025-03-26 DIAGNOSIS — R94.31 ABNORMAL ECG: ICD-10-CM

## 2025-03-26 DIAGNOSIS — R06.02 EXERTIONAL SHORTNESS OF BREATH: Primary | ICD-10-CM

## 2025-03-26 DIAGNOSIS — R01.1 SYSTOLIC MURMUR: ICD-10-CM

## 2025-03-26 PROCEDURE — 99215 OFFICE O/P EST HI 40 MIN: CPT | Performed by: INTERNAL MEDICINE

## 2025-03-26 PROCEDURE — 3078F DIAST BP <80 MM HG: CPT | Performed by: INTERNAL MEDICINE

## 2025-03-26 PROCEDURE — 3074F SYST BP LT 130 MM HG: CPT | Performed by: INTERNAL MEDICINE

## 2025-03-26 RX ORDER — ERGOCALCIFEROL 1.25 MG/1
CAPSULE, LIQUID FILLED ORAL
COMMUNITY
Start: 2025-03-25

## 2025-03-26 RX ORDER — LOSARTAN POTASSIUM AND HYDROCHLOROTHIAZIDE 12.5; 5 MG/1; MG/1
1 TABLET ORAL DAILY
Qty: 90 TABLET | Refills: 3 | Status: SHIPPED | OUTPATIENT
Start: 2025-03-26

## 2025-03-26 RX ORDER — TERBINAFINE HYDROCHLORIDE 250 MG/1
250 TABLET ORAL DAILY
COMMUNITY
Start: 2024-12-31

## 2025-03-26 RX ORDER — FLUTICASONE PROPIONATE 50 MCG
SPRAY, SUSPENSION (ML) NASAL
COMMUNITY
Start: 2025-01-12

## 2025-03-26 RX ORDER — EPINEPHRINE 0.3 MG/.3ML
INJECTION SUBCUTANEOUS
COMMUNITY
Start: 2025-02-12

## 2025-03-26 ASSESSMENT — ENCOUNTER SYMPTOMS
ALLERGIC/IMMUNOLOGIC NEGATIVE: 1
EYES NEGATIVE: 1
SHORTNESS OF BREATH: 0
GASTROINTESTINAL NEGATIVE: 1

## 2025-03-26 ASSESSMENT — PATIENT HEALTH QUESTIONNAIRE - PHQ9
2. FEELING DOWN, DEPRESSED OR HOPELESS: NOT AT ALL
1. LITTLE INTEREST OR PLEASURE IN DOING THINGS: NOT AT ALL
SUM OF ALL RESPONSES TO PHQ QUESTIONS 1-9: 0

## 2025-03-26 NOTE — PROGRESS NOTES
Waqas Roach (:  1965) is a 60 y.o. male,Established patient, here for evaluation of the following chief complaint(s):  Follow-up (7month)      Subjective   SUBJECTIVE/OBJECTIVE:       History of Present Illness  The patient presents for evaluation of diastolic dysfunction, sleep apnea, and stroke. He has a history of stroke that occurred in the fall and winter of . He states the first event occurred around September or October and the second one in December of that year. He has no history of known cardiomyopathy or atrial fibrillation, but it was felt that his stroke came from a cardiovascular source. He does have cardiac risks to include hypertension, diabetes, and hypercholesterolemia. However, no history of known coronary disease or systolic heart failure.      He reports no chest pain, shortness of breath, lightheadedness, or dizziness. Physical activity is limited to walking within his home. He describes his efforts to \"try to stay healthy\" but admits to spending most of his day watching television. Walking is reported to occur daily, though specifics on duration or intensity are not provided.    A history of two strokes is noted, and he is currently on Eliquis for atrial fibrillation. An adverse reaction to lisinopril, presenting as a cough, is documented. Sleep apnea is diagnosed, and he uses a CPAP machine nightly. It is reported that he sleeps approximately 15 to 16 hours per day.    He is scheduled for a colonoscopy on 04/15/2025.    SOCIAL HISTORY  Exercise: Walking daily  I have carefully reviewed all available medical records, previous office notes, lab, x-ray and procedure reports    Past Medical History:   Diagnosis Date    Asthma     Cerebral artery occlusion with cerebral infarction (HCC)     Diabetes mellitus (HCC)     Hypertension         History reviewed. No pertinent surgical history.     Allergies   Allergen Reactions    Lisinopril Cough    Statins Other (See Comments)

## 2025-03-26 NOTE — PROGRESS NOTES
1. \"Have you been to the ER, urgent care clinic since your last visit?  Hospitalized since your last visit?\" Reviewed by Dr. Sukhi Hunter    2. \"Have you seen or consulted any other health care providers outside of the [facility] since your last visit?\" Reviewed by Dr. Sukhi Hunter

## 2025-04-12 RX ORDER — APIXABAN 5 MG/1
5 TABLET, FILM COATED ORAL 2 TIMES DAILY
Qty: 180 TABLET | Refills: 3 | Status: SHIPPED | OUTPATIENT
Start: 2025-04-12